# Patient Record
Sex: FEMALE | Race: OTHER | NOT HISPANIC OR LATINO | Employment: FULL TIME | URBAN - METROPOLITAN AREA
[De-identification: names, ages, dates, MRNs, and addresses within clinical notes are randomized per-mention and may not be internally consistent; named-entity substitution may affect disease eponyms.]

---
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
## 2020-03-20 ENCOUNTER — HOSPITAL ENCOUNTER (EMERGENCY)
Facility: HOSPITAL | Age: 20
Discharge: HOME | End: 2020-03-20
Attending: EMERGENCY MEDICINE | Admitting: EMERGENCY MEDICINE

## 2020-03-20 ENCOUNTER — APPOINTMENT (OUTPATIENT)
Dept: CT IMAGING | Facility: HOSPITAL | Age: 20
End: 2020-03-20

## 2020-03-20 VITALS
BODY MASS INDEX: 33.66 KG/M2 | DIASTOLIC BLOOD PRESSURE: 71 MMHG | OXYGEN SATURATION: 98 % | SYSTOLIC BLOOD PRESSURE: 106 MMHG | RESPIRATION RATE: 14 BRPM | HEIGHT: 63 IN | WEIGHT: 190 LBS | HEART RATE: 120 BPM | TEMPERATURE: 97.6 F

## 2020-03-20 DIAGNOSIS — S09.90XA INJURY OF HEAD, INITIAL ENCOUNTER: Primary | ICD-10-CM

## 2020-03-20 PROCEDURE — 99282 EMERGENCY DEPT VISIT SF MDM: CPT

## 2020-03-20 PROCEDURE — 70450 CT HEAD/BRAIN W/O DYE: CPT

## 2020-05-26 ENCOUNTER — LAB (OUTPATIENT)
Dept: LAB | Facility: HOSPITAL | Age: 20
End: 2020-05-26

## 2020-05-26 ENCOUNTER — TRANSCRIBE ORDERS (OUTPATIENT)
Dept: LAB | Facility: HOSPITAL | Age: 20
End: 2020-05-26

## 2020-05-26 DIAGNOSIS — Z78.9 OTHER SPECIFIED CONSTITUTIONAL STATES IN DEVELOPMENT: Primary | ICD-10-CM

## 2020-05-26 DIAGNOSIS — Z78.9 OTHER SPECIFIED CONSTITUTIONAL STATES IN DEVELOPMENT: ICD-10-CM

## 2020-05-26 LAB
HBV SURFACE AB SER RIA-ACNC: REACTIVE
HCV AB SER DONR QL: NORMAL
HIV1+2 AB SER QL: NORMAL

## 2020-05-26 PROCEDURE — 86592 SYPHILIS TEST NON-TREP QUAL: CPT

## 2020-05-26 PROCEDURE — G0432 EIA HIV-1/HIV-2 SCREEN: HCPCS

## 2020-05-26 PROCEDURE — 36415 COLL VENOUS BLD VENIPUNCTURE: CPT

## 2020-05-26 PROCEDURE — 86706 HEP B SURFACE ANTIBODY: CPT

## 2020-05-26 PROCEDURE — 86803 HEPATITIS C AB TEST: CPT

## 2020-05-27 LAB — RPR SER QL: NORMAL

## 2020-10-07 ENCOUNTER — TRANSCRIBE ORDERS (OUTPATIENT)
Dept: LAB | Facility: HOSPITAL | Age: 20
End: 2020-10-07

## 2020-10-07 ENCOUNTER — LAB (OUTPATIENT)
Dept: LAB | Facility: HOSPITAL | Age: 20
End: 2020-10-07

## 2020-10-07 DIAGNOSIS — R30.0 DYSURIA: ICD-10-CM

## 2020-10-07 DIAGNOSIS — R30.0 DYSURIA: Primary | ICD-10-CM

## 2020-10-07 PROCEDURE — 87088 URINE BACTERIA CULTURE: CPT

## 2020-10-07 PROCEDURE — 81001 URINALYSIS AUTO W/SCOPE: CPT

## 2020-10-07 PROCEDURE — 87186 SC STD MICRODIL/AGAR DIL: CPT

## 2020-10-07 PROCEDURE — 87086 URINE CULTURE/COLONY COUNT: CPT

## 2020-10-08 LAB
BACTERIA UR QL AUTO: ABNORMAL /HPF
BILIRUB UR QL STRIP: NEGATIVE
CLARITY UR: ABNORMAL
COD CRY URNS QL: ABNORMAL /HPF
COLOR UR: ABNORMAL
GLUCOSE UR STRIP-MCNC: NEGATIVE MG/DL
HGB UR QL STRIP.AUTO: ABNORMAL
HYALINE CASTS UR QL AUTO: ABNORMAL /LPF
KETONES UR QL STRIP: NEGATIVE
LEUKOCYTE ESTERASE UR QL STRIP.AUTO: ABNORMAL
NITRITE UR QL STRIP: POSITIVE
PH UR STRIP.AUTO: 5.5 [PH] (ref 5–8)
PROT UR QL STRIP: NEGATIVE
RBC # UR: ABNORMAL /HPF
REF LAB TEST METHOD: ABNORMAL
SP GR UR STRIP: 1.03 (ref 1–1.03)
SQUAMOUS #/AREA URNS HPF: ABNORMAL /HPF
UROBILINOGEN UR QL STRIP: ABNORMAL
WBC UR QL AUTO: ABNORMAL /HPF

## 2020-10-10 LAB — BACTERIA SPEC AEROBE CULT: ABNORMAL

## 2021-09-14 PROCEDURE — U0004 COV-19 TEST NON-CDC HGH THRU: HCPCS | Performed by: NURSE PRACTITIONER

## 2021-09-16 ENCOUNTER — TELEPHONE (OUTPATIENT)
Dept: URGENT CARE | Facility: CLINIC | Age: 21
End: 2021-09-16

## 2021-10-19 ENCOUNTER — OFFICE VISIT (OUTPATIENT)
Dept: FAMILY MEDICINE CLINIC | Facility: CLINIC | Age: 21
End: 2021-10-19

## 2021-10-19 ENCOUNTER — TELEPHONE (OUTPATIENT)
Dept: FAMILY MEDICINE CLINIC | Facility: CLINIC | Age: 21
End: 2021-10-19

## 2021-10-19 VITALS
OXYGEN SATURATION: 98 % | BODY MASS INDEX: 45.36 KG/M2 | SYSTOLIC BLOOD PRESSURE: 124 MMHG | DIASTOLIC BLOOD PRESSURE: 76 MMHG | HEIGHT: 63 IN | WEIGHT: 256 LBS | HEART RATE: 101 BPM

## 2021-10-19 DIAGNOSIS — R10.9 ABDOMINAL PAIN, UNSPECIFIED ABDOMINAL LOCATION: ICD-10-CM

## 2021-10-19 DIAGNOSIS — Z00.00 PREVENTATIVE HEALTH CARE: ICD-10-CM

## 2021-10-19 DIAGNOSIS — F41.9 ANXIETY AND DEPRESSION: ICD-10-CM

## 2021-10-19 DIAGNOSIS — F32.A ANXIETY AND DEPRESSION: ICD-10-CM

## 2021-10-19 DIAGNOSIS — R11.0 NAUSEA: Primary | ICD-10-CM

## 2021-10-19 PROCEDURE — 99214 OFFICE O/P EST MOD 30 MIN: CPT | Performed by: PHYSICIAN ASSISTANT

## 2021-10-19 RX ORDER — FLUOXETINE HYDROCHLORIDE 20 MG/1
20 CAPSULE ORAL DAILY
Qty: 30 CAPSULE | Refills: 1 | OUTPATIENT
Start: 2021-10-19 | End: 2021-12-09

## 2021-10-19 RX ORDER — FAMOTIDINE 20 MG/1
20 TABLET, FILM COATED ORAL 2 TIMES DAILY
Qty: 60 TABLET | Refills: 1 | OUTPATIENT
Start: 2021-10-19 | End: 2021-12-09

## 2021-10-19 NOTE — TELEPHONE ENCOUNTER
Please notify the patient I am sending in fluoxetine for her to start once daily in the morning. It is okay for her to continue taking hydroxyzine with this medication.
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
## 2021-10-19 NOTE — TELEPHONE ENCOUNTER
Caller: Miguel Angel Zuniga    Relationship: Self    Best call back number: [phone]    What was the call regarding: PATIENT STATES THE MEDICATION SHE TAKES IS HYDROXYZINE 25 MG

## 2021-10-20 ENCOUNTER — TELEPHONE (OUTPATIENT)
Dept: FAMILY MEDICINE CLINIC | Facility: CLINIC | Age: 21
End: 2021-10-20

## 2021-10-20 RX ORDER — HYDROXYZINE HYDROCHLORIDE 25 MG/1
25 TABLET, FILM COATED ORAL 3 TIMES DAILY PRN
Qty: 60 TABLET | Refills: 1 | OUTPATIENT
Start: 2021-10-20 | End: 2021-12-09

## 2021-10-20 NOTE — TELEPHONE ENCOUNTER
Caller: Miguel Angel Zuniga    Relationship: Self    Best call back number: [phone]    What is the best time to reach you: ANYTIME     Who are you requesting to speak with (clinical staff, provider,  specific staff member): CLINICAL STAFFF      What was the call regarding: THE PATIENT STATES THAT SHE WAS SEEN YESTERDAY BY EMILY GAMA AND DURING THAT APPOINTMENT HE TOLD HER THAT HE WOULD CALL IN A MEDICATION FOR ANXIETY AND DEPRESSION TO [facility]. THE PHARMACY TOLD THE PATIENT THAT THE DID NOT RECEIVE A PRESCRIPTION PLEASE CALL PATIENT TO LET HER KNOW IF THE MEDICATION WAS SENT IN     Do you require a callback: YES

## 2021-10-20 NOTE — TELEPHONE ENCOUNTER
Pepcid and Prozac were sent yesterday. She is also on hydroxyzine. I will refill that one as well. Please confirm that these are received with her pharmacy.

## 2021-10-21 NOTE — TELEPHONE ENCOUNTER
Called pt to inform. Stated she had not received the fluoxetine. Called pharmacy. Informed pt picked up the fluoxetine and hydroxyzine yesterday and famotidine day before. Called pt back. LVM informing pt should have all medications ordered. Office number given for any questions/concerns.

## 2021-12-09 PROCEDURE — U0004 COV-19 TEST NON-CDC HGH THRU: HCPCS | Performed by: FAMILY MEDICINE

## 2021-12-10 ENCOUNTER — TELEPHONE (OUTPATIENT)
Dept: URGENT CARE | Facility: CLINIC | Age: 21
End: 2021-12-10

## 2021-12-10 NOTE — TELEPHONE ENCOUNTER
INFORMED PT COVID NOT DETECTED. REQUESTED PRINTED COPY OF RESULTS, THEY ARE IN PT PICKUP. NO QUESTIONS. NO CONCERNS.

## 2022-01-12 PROCEDURE — U0004 COV-19 TEST NON-CDC HGH THRU: HCPCS | Performed by: FAMILY MEDICINE

## 2022-01-13 ENCOUNTER — TELEPHONE (OUTPATIENT)
Dept: URGENT CARE | Facility: CLINIC | Age: 22
End: 2022-01-13

## 2022-01-13 NOTE — TELEPHONE ENCOUNTER
----- Message from Raghav Marc MD sent at 1/13/2022 12:59 PM EST -----  Please inform patient of negative COVID test    ----- Message -----  From: Lab, Background User  Sent: 1/13/2022  12:53 PM EST  To: [facility] Jimy Lei Covid Results

## 2022-01-19 ENCOUNTER — OFFICE VISIT (OUTPATIENT)
Dept: FAMILY MEDICINE CLINIC | Facility: CLINIC | Age: 22
End: 2022-01-19

## 2022-01-19 VITALS
WEIGHT: 260.6 LBS | BODY MASS INDEX: 46.18 KG/M2 | OXYGEN SATURATION: 99 % | DIASTOLIC BLOOD PRESSURE: 88 MMHG | HEIGHT: 63 IN | HEART RATE: 106 BPM | SYSTOLIC BLOOD PRESSURE: 138 MMHG

## 2022-01-19 DIAGNOSIS — J06.9 ACUTE URI: Primary | ICD-10-CM

## 2022-01-19 DIAGNOSIS — Z20.822 SUSPECTED 2019 NOVEL CORONAVIRUS INFECTION: ICD-10-CM

## 2022-01-19 DIAGNOSIS — J06.9 ACUTE URI: ICD-10-CM

## 2022-01-19 PROCEDURE — U0004 COV-19 TEST NON-CDC HGH THRU: HCPCS | Performed by: FAMILY MEDICINE

## 2022-01-19 PROCEDURE — 99213 OFFICE O/P EST LOW 20 MIN: CPT | Performed by: FAMILY MEDICINE

## 2022-01-19 NOTE — PROGRESS NOTES
"Chief Complaint  Cough, Headache, Vomiting, Generalized Body Aches, and Shortness of Breath    Subjective          Miguel Angel Zuniga presents to [facility]  Cough  This is a new problem. The current episode started yesterday. The cough is productive of sputum. Associated symptoms include headaches, nasal congestion, postnasal drip and shortness of breath. Pertinent negatives include no chills, rhinorrhea, sore throat or wheezing. She has tried OTC cough suppressant for the symptoms. The treatment provided mild relief. Her past medical history is significant for asthma.   Headache   Associated symptoms include coughing and vomiting. Pertinent negatives include no rhinorrhea or sore throat.   Vomiting   This is a new problem. Emesis appearance: mucus. Associated symptoms include coughing and headaches. Pertinent negatives include no chills.   Shortness of Breath  Associated symptoms include headaches and vomiting. Pertinent negatives include no rhinorrhea, sore throat or wheezing. Exacerbated by: coughing. Her past medical history is significant for asthma.     She was seen at urgent care on 1/12/2022 and had COVID testing done at that time which was negative. Last week migraine, stomach pain and body aches. Yesterday started cough with mucus. Heavy breathing after coughing. Felt feverish yesterday. She doesn't used inhalers for asthma in awhile.           Objective   Vital Signs:   /88   Pulse 106   Ht 160 cm (62.99\")   Wt 118 kg (260 lb 9.6 oz)   SpO2 99%   BMI 46.17 kg/m²     Physical Exam  Vitals reviewed.   Constitutional:       General: She is not in acute distress.     Appearance: She is ill-appearing. She is not toxic-appearing or diaphoretic.   HENT:      Nose: Mucosal edema present. No rhinorrhea.      Right Sinus: No maxillary sinus tenderness or frontal sinus tenderness.      Left Sinus: No maxillary sinus tenderness or frontal sinus tenderness.      Mouth/Throat:      " Mouth: Mucous membranes are moist.      Pharynx: Oropharyngeal exudate ( clear PND) present. No pharyngeal swelling, posterior oropharyngeal erythema or uvula swelling.      Tonsils: No tonsillar exudate or tonsillar abscesses. 2+ on the right. 2+ on the left.   Eyes:      General:         Right eye: No discharge.         Left eye: No discharge.   Cardiovascular:      Rate and Rhythm: Normal rate and regular rhythm.   Pulmonary:      Effort: Pulmonary effort is normal.      Breath sounds: Normal breath sounds.   Skin:     Findings: Rash ( facial acne) present.   Neurological:      Mental Status: She is alert.        Result Review :   The following data was reviewed by: Pinky Pugh MD on 01/19/2022:             [facility] with Nicol Ayala APRN (01/12/2022)  POC Urinalysis Dipstick, Multipro (Automated dipstick) (01/12/2022 17:22)  COVID-19 PCR, [facility], NP SWAB IN [facility] VIRAL TRANSPORT MEDIA/ORAL SWISH 24-30 HR TAT - Saliva, Oral Cavity (01/12/2022 17:20)    Assessment and Plan    Diagnoses and all orders for this visit:    1. Acute URI (Primary)  -     COVID-19 PCR, [facility], NP SWAB IN [facility] VIRAL TRANSPORT MEDIA/ORAL SWISH 24-30 HR TAT - Swab, Nasopharynx; Future    2. Suspected 2019 novel coronavirus infection  -     COVID-19 PCR, [facility], NP SWAB IN [facility] VIRAL TRANSPORT MEDIA/ORAL SWISH 24-30 HR TAT - Swab, Nasopharynx; Future    Likely viral infection and no need for antibiotics at this time.  Recommend supportive care with over-the-counter products.  COVID swab collected today and advised to stay in quarantine with pending results.  Work note provided.        Follow Up   Return if symptoms worsen or fail to improve.  Patient was given instructions and counseling regarding her condition or for health maintenance advice. Please see specific information pulled into the AVS if appropriate.     Electronically signed by Pinky Pugh MD, 01/19/22, 2:32 PM EST.

## 2022-01-20 LAB — SARS-COV-2 RNA NOSE QL NAA+PROBE: DETECTED

## 2022-01-21 ENCOUNTER — TELEPHONE (OUTPATIENT)
Dept: FAMILY MEDICINE CLINIC | Facility: CLINIC | Age: 22
End: 2022-01-21

## 2022-02-10 ENCOUNTER — LAB (OUTPATIENT)
Dept: LAB | Facility: HOSPITAL | Age: 22
End: 2022-02-10

## 2022-02-10 ENCOUNTER — OFFICE VISIT (OUTPATIENT)
Dept: FAMILY MEDICINE CLINIC | Facility: CLINIC | Age: 22
End: 2022-02-10

## 2022-02-10 VITALS
DIASTOLIC BLOOD PRESSURE: 80 MMHG | OXYGEN SATURATION: 98 % | BODY MASS INDEX: 60.03 KG/M2 | WEIGHT: 259.4 LBS | SYSTOLIC BLOOD PRESSURE: 112 MMHG | HEIGHT: 55 IN | HEART RATE: 111 BPM

## 2022-02-10 DIAGNOSIS — K52.9 CHRONIC DIARRHEA: ICD-10-CM

## 2022-02-10 DIAGNOSIS — Z00.00 PREVENTATIVE HEALTH CARE: ICD-10-CM

## 2022-02-10 DIAGNOSIS — R11.0 NAUSEA: ICD-10-CM

## 2022-02-10 DIAGNOSIS — R10.84 GENERALIZED POSTPRANDIAL ABDOMINAL PAIN: ICD-10-CM

## 2022-02-10 DIAGNOSIS — R10.84 GENERALIZED POSTPRANDIAL ABDOMINAL PAIN: Primary | ICD-10-CM

## 2022-02-10 LAB
25(OH)D3 SERPL-MCNC: 14.2 NG/ML
DEPRECATED RDW RBC AUTO: 41.3 FL (ref 37–54)
ERYTHROCYTE [DISTWIDTH] IN BLOOD BY AUTOMATED COUNT: 13 % (ref 12.3–15.4)
EXPIRATION DATE: NORMAL
FLUAV AG NPH QL: NEGATIVE
FLUBV AG NPH QL: NEGATIVE
HCT VFR BLD AUTO: 41.5 % (ref 34–46.6)
HGB BLD-MCNC: 14.2 G/DL (ref 12–15.9)
INTERNAL CONTROL: NORMAL
Lab: NORMAL
MCH RBC QN AUTO: 30 PG (ref 26.6–33)
MCHC RBC AUTO-ENTMCNC: 34.2 G/DL (ref 31.5–35.7)
MCV RBC AUTO: 87.7 FL (ref 79–97)
PLATELET # BLD AUTO: 513 10*3/MM3 (ref 140–450)
PMV BLD AUTO: 10.5 FL (ref 6–12)
RBC # BLD AUTO: 4.73 10*6/MM3 (ref 3.77–5.28)
VIT B12 BLD-MCNC: 473 PG/ML (ref 211–946)
WBC NRBC COR # BLD: 9.23 10*3/MM3 (ref 3.4–10.8)

## 2022-02-10 PROCEDURE — 86231 EMA EACH IG CLASS: CPT

## 2022-02-10 PROCEDURE — 84443 ASSAY THYROID STIM HORMONE: CPT

## 2022-02-10 PROCEDURE — 82784 ASSAY IGA/IGD/IGG/IGM EACH: CPT

## 2022-02-10 PROCEDURE — 80053 COMPREHEN METABOLIC PANEL: CPT

## 2022-02-10 PROCEDURE — 82607 VITAMIN B-12: CPT

## 2022-02-10 PROCEDURE — 86140 C-REACTIVE PROTEIN: CPT

## 2022-02-10 PROCEDURE — 86364 TISS TRNSGLTMNASE EA IG CLAS: CPT

## 2022-02-10 PROCEDURE — 87804 INFLUENZA ASSAY W/OPTIC: CPT | Performed by: STUDENT IN AN ORGANIZED HEALTH CARE EDUCATION/TRAINING PROGRAM

## 2022-02-10 PROCEDURE — 83690 ASSAY OF LIPASE: CPT

## 2022-02-10 PROCEDURE — 85027 COMPLETE CBC AUTOMATED: CPT

## 2022-02-10 PROCEDURE — 36415 COLL VENOUS BLD VENIPUNCTURE: CPT

## 2022-02-10 PROCEDURE — 99214 OFFICE O/P EST MOD 30 MIN: CPT | Performed by: STUDENT IN AN ORGANIZED HEALTH CARE EDUCATION/TRAINING PROGRAM

## 2022-02-10 PROCEDURE — 82306 VITAMIN D 25 HYDROXY: CPT

## 2022-02-10 RX ORDER — ONDANSETRON 4 MG/1
4 TABLET, ORALLY DISINTEGRATING ORAL EVERY 8 HOURS PRN
Qty: 30 TABLET | Refills: 0 | Status: SHIPPED | OUTPATIENT
Start: 2022-02-10 | End: 2022-11-23

## 2022-02-10 NOTE — PROGRESS NOTES
"  Established Office Visit      Patient Name: Miguel Angel Zuniga  : 2000   MRN: [id number]   Care Team: Patient Care Team:  Cristhian Brown PA as PCP - General (Physician Assistant)    Chief Complaint:    Chief Complaint   Patient presents with   • Abdominal Pain   • Diarrhea   • Generalized Body Aches       History of Present Illness: Miguel Angel Zuniga is a 21 y.o. female who is here today to discuss complaint of diarrhea and abdominal pain    Patient complains of acute on chronic abdominal pain. Reports ongoing generalized abdominal pain which flares about 3x per month with diarrhea and nausea. Symptoms usually start about 15 minutes after eating a meal and improve spontaneously within 24 hours. Unable to identify dietary triggers. She experienced intense diffuse abdominal pain (R>L) yesterday with several episodes of diarrhea, today this has resolved.     Denies fever, chills, hematochezia, changes in stool color, or vomiting.     She recently recovered from covid19 infection, diagnosed on  and quarantined until .     Subjective      Review of Systems:   Review of Systems - See HPI    I have reviewed and the following portions of the patient's history were updated as appropriate: past family history, past medical history, past social history, past surgical history and problem list.    Medications:     Current Outpatient Medications:   •  ondansetron ODT (Zofran ODT) 4 MG disintegrating tablet, Place 1 tablet on the tongue Every 8 (Eight) Hours As Needed for Nausea or Vomiting., Disp: 30 tablet, Rfl: 0    Allergies:   No Known Allergies    Objective     Physical Exam:  Vital Signs:   Vitals:    02/10/22 1349   BP: 112/80   Pulse: 111   SpO2: 98%   Weight: 118 kg (259 lb 6.4 oz)   Height: 63 cm (24.8\")     Body mass index is 296.55 kg/m².     Physical Exam  Vitals reviewed.   Constitutional:       Appearance: Normal appearance. She is obese.   Cardiovascular:      Rate and Rhythm: Normal rate.      Pulses: " Normal pulses.   Pulmonary:      Effort: Pulmonary effort is normal. No respiratory distress.   Abdominal:      General: Abdomen is flat. Bowel sounds are normal. There is no distension.      Palpations: Abdomen is soft. There is no mass.      Tenderness: There is no abdominal tenderness. There is no guarding or rebound.   Skin:     General: Skin is warm and dry.   Neurological:      Mental Status: She is alert.   Psychiatric:         Mood and Affect: Mood normal.         Behavior: Behavior normal.         Judgment: Judgment normal.         Assessment / Plan      Assessment/Plan:   Problems Addressed This Visit  Diagnoses and all orders for this visit:    1. Generalized postprandial abdominal pain (Primary)  -     Comprehensive metabolic panel; Future  -     C-reactive protein; Future  -     Celiac Disease Panel; Future  -     US Gallbladder; Future  -     CBC No Differential; Future  -     TSH Rfx On Abnormal To Free T4; Future    2. Nausea  -     POCT Influenza A/B  -     ondansetron ODT (Zofran ODT) 4 MG disintegrating tablet; Place 1 tablet on the tongue Every 8 (Eight) Hours As Needed for Nausea or Vomiting.  Dispense: 30 tablet; Refill: 0    3. Chronic diarrhea  -     Comprehensive metabolic panel; Future  -     C-reactive protein; Future  -     Celiac Disease Panel; Future  -     CBC No Differential; Future  -     TSH Rfx On Abnormal To Free T4; Future    Discussed multiple etiologies for her abdominal pain which has been ongoing for years and flares about 3x per month with intense abdominal cramping, diarrhea, and nausea. Her exam is benign today and symptoms have resolved. We were able to pinpoint that symptoms occur almost exclusively after eating. Will obtain RUQ US to evaluate for gall gladder dysfunction. Will obtain labs to evaluate for underlying metabolic process and celiac disease. Discussed possibility of IBS-D as well. I have advised her to start food journal to identify patterns and triggers.        Plan of care reviewed with patient at the conclusion of today's visit. Education was provided regarding diagnosis and management.  Patient verbalizes understanding of and agreement with management plan.    Follow Up:   Return for Next scheduled follow up.        DO RAJWINDER Busby RD  [facility]  [id number]  [facility] [address]  Fax [phone]  Phone [phone]

## 2022-02-11 ENCOUNTER — TELEPHONE (OUTPATIENT)
Dept: FAMILY MEDICINE CLINIC | Facility: CLINIC | Age: 22
End: 2022-02-11

## 2022-02-11 DIAGNOSIS — E55.9 VITAMIN D DEFICIENCY: Primary | ICD-10-CM

## 2022-02-11 LAB
ALBUMIN SERPL-MCNC: 4.2 G/DL (ref 3.5–5.2)
ALBUMIN/GLOB SERPL: 1.7 G/DL
ALP SERPL-CCNC: 99 U/L (ref 39–117)
ALT SERPL W P-5'-P-CCNC: 46 U/L (ref 1–33)
ANION GAP SERPL CALCULATED.3IONS-SCNC: 9.4 MMOL/L (ref 5–15)
AST SERPL-CCNC: 25 U/L (ref 1–32)
BILIRUB SERPL-MCNC: 0.4 MG/DL (ref 0–1.2)
BUN SERPL-MCNC: 6 MG/DL (ref 6–20)
BUN/CREAT SERPL: 10.2 (ref 7–25)
CALCIUM SPEC-SCNC: 9.4 MG/DL (ref 8.6–10.5)
CHLORIDE SERPL-SCNC: 105 MMOL/L (ref 98–107)
CO2 SERPL-SCNC: 24.6 MMOL/L (ref 22–29)
CREAT SERPL-MCNC: 0.59 MG/DL (ref 0.57–1)
CRP SERPL-MCNC: 1.45 MG/DL (ref 0–0.5)
GFR SERPL CREATININE-BSD FRML MDRD: 129 ML/MIN/1.73
GFR SERPL CREATININE-BSD FRML MDRD: >150 ML/MIN/1.73
GLOBULIN UR ELPH-MCNC: 2.5 GM/DL
GLUCOSE SERPL-MCNC: 115 MG/DL (ref 65–99)
LIPASE SERPL-CCNC: 28 U/L (ref 13–60)
POTASSIUM SERPL-SCNC: 3.6 MMOL/L (ref 3.5–5.2)
PROT SERPL-MCNC: 6.7 G/DL (ref 6–8.5)
SODIUM SERPL-SCNC: 139 MMOL/L (ref 136–145)
TSH SERPL DL<=0.05 MIU/L-ACNC: 3.5 UIU/ML (ref 0.27–4.2)

## 2022-02-11 RX ORDER — MULTIVIT-MIN/IRON/FOLIC ACID/K 18-600-40
2000 CAPSULE ORAL DAILY
Qty: 90 CAPSULE | Refills: 1 | Status: SHIPPED | OUTPATIENT
Start: 2022-02-11 | End: 2022-11-23

## 2022-02-11 NOTE — TELEPHONE ENCOUNTER
I attempted to call patient back x2 regarding lab results. No answer, left voicemail.      Overall labs look okay. Her CRP is very mildly elevated which is a very nonspecific lab for inflammation. This may be due to her recovering from recent covid infection.   Her Liver function and kidney function look good. Electrolytes normal. Blood counts look good. Thyroid function is normal. Lipase is normal.     Vitamin D level is low, I have sent in a vitamin D replacement she should take once daily.    Still waiting on celiac disease screening to return which will take several days.

## 2022-02-11 NOTE — TELEPHONE ENCOUNTER
PATIENT HAS CALLED REQUESTING A CALL BACK WITH THE RESULTS OF HER LAB WORK FORM 2/10/22. PATIENT IS ALSO WANTING TO KNOW IF AN ULTRASOUND HAS BEEN SCHEDULED FOR HER.    CALL BACK NUMBER [phone]

## 2022-02-13 LAB
ENDOMYSIUM IGA SER QL: NEGATIVE
IGA SERPL-MCNC: 267 MG/DL (ref 87–352)
TTG IGA SER-ACNC: <2 U/ML (ref 0–3)

## 2022-02-15 PROBLEM — E55.9 VITAMIN D DEFICIENCY: Status: ACTIVE | Noted: 2022-02-15

## 2022-02-15 RX ORDER — ERGOCALCIFEROL 1.25 MG/1
50000 CAPSULE ORAL WEEKLY
Qty: 12 CAPSULE | Refills: 0 | Status: SHIPPED | OUTPATIENT
Start: 2022-02-15 | End: 2022-11-23

## 2022-02-15 NOTE — TELEPHONE ENCOUNTER
Called and spoke to patient. Verbalized understanding. Patient asked if she should still have her ultrasound? Stated she is still having abd pain, however her mother told her she also might be lactose intolerant?  Patient just wanted to make sure she should still continue with ultrasound. Please advise, thanksHad no further questions at this time.

## 2022-02-16 NOTE — TELEPHONE ENCOUNTER
The ultrasound was ordered to evaluate her gallbladder. If she wants to make some dietary changes like eliminating lactose to see if things get better before scheduling the ultrasound, I think that is reasonable. But if she would like to go ahead and have her gallbladder evaluated then I would keep the appointment.

## 2022-02-17 NOTE — TELEPHONE ENCOUNTER
Called and informed patient of providers recommendations. Verbalized understanding. Stated she is still wanting to have US completed just to see what it says as well. Had no further questions/concerns at this time.

## 2022-02-22 ENCOUNTER — OFFICE VISIT (OUTPATIENT)
Dept: FAMILY MEDICINE CLINIC | Facility: CLINIC | Age: 22
End: 2022-02-22

## 2022-02-22 VITALS
BODY MASS INDEX: 46.42 KG/M2 | WEIGHT: 262 LBS | DIASTOLIC BLOOD PRESSURE: 84 MMHG | SYSTOLIC BLOOD PRESSURE: 134 MMHG | HEART RATE: 116 BPM | HEIGHT: 63 IN | OXYGEN SATURATION: 98 % | RESPIRATION RATE: 16 BRPM | TEMPERATURE: 97.3 F

## 2022-02-22 DIAGNOSIS — E66.01 MORBID (SEVERE) OBESITY DUE TO EXCESS CALORIES: Primary | ICD-10-CM

## 2022-02-22 DIAGNOSIS — Z23 NEED FOR IMMUNIZATION AGAINST INFLUENZA: ICD-10-CM

## 2022-02-22 DIAGNOSIS — R00.0 TACHYCARDIA: ICD-10-CM

## 2022-02-22 PROBLEM — E66.813 CLASS 3 SEVERE OBESITY DUE TO EXCESS CALORIES WITH BODY MASS INDEX (BMI) OF 45.0 TO 49.9 IN ADULT: Status: ACTIVE | Noted: 2022-02-22

## 2022-02-22 PROCEDURE — 99213 OFFICE O/P EST LOW 20 MIN: CPT | Performed by: PHYSICIAN ASSISTANT

## 2022-02-22 PROCEDURE — 90686 IIV4 VACC NO PRSV 0.5 ML IM: CPT | Performed by: PHYSICIAN ASSISTANT

## 2022-02-22 PROCEDURE — 90471 IMMUNIZATION ADMIN: CPT | Performed by: PHYSICIAN ASSISTANT

## 2022-02-22 RX ORDER — NICOTINE 21 MG/24HR
1 PATCH, TRANSDERMAL 24 HOURS TRANSDERMAL EVERY 24 HOURS
Qty: 42 PATCH | Refills: 0 | Status: SHIPPED | OUTPATIENT
Start: 2022-02-22 | End: 2022-04-05

## 2022-02-22 NOTE — PROGRESS NOTES
"Chief Complaint   Patient presents with   • Weight Loss       HPI     Miguel Angel Zuniga is a 21 y.o. female who is here for evaluation of \"chief complaint.\"     She is supposed to have a Zoom meeting tomorrow with bariatric surgery.  She was notified that she will need to have monthly visits for 6 months for approval of this through her insurance. Has tried various diets in the past and exercising none of which helped her to lose weight significantly. She is trying to eat 2 meals instead of 3 daily at this time. She is not following a particular diet however. She did go grocery shopping and plans on trying to eat more vegetables and fruits and reduce junk food and snacks. She walks due to her work but does not exercise outside of her work. History of GERD.     Pulse high for a few days, was told this when she went to donate plasma. She is asymptomatic and denies chest pain, shortness of breath, palpitations, and dizziness. She has not had any caffeine today. Vapes and this does contain nicotine. Denies OTC decongestants.     Past Medical History:   Diagnosis Date   • Asthma    • Depression    • GERD (gastroesophageal reflux disease)    • Hypertension        Past Surgical History:   Procedure Laterality Date   • NO PAST SURGERIES         Family History   Problem Relation Age of Onset   • Anxiety disorder Sister    • Depression Sister    • Post-traumatic stress disorder Maternal Uncle    • Anxiety disorder Maternal Uncle    • Depression Maternal Uncle        Social History     Socioeconomic History   • Marital status: Single   Tobacco Use   • Smoking status: Former Smoker     Types: Cigarettes     Quit date: 2021     Years since quittin.8   • Smokeless tobacco: Never Used   Vaping Use   • Vaping Use: Every day   • Substances: Nicotine   Substance and Sexual Activity   • Alcohol use: Yes     Comment: OCASSIONALY   • Drug use: Not Currently   • Sexual activity: Defer       No Known Allergies    ROS    Review of " Systems   Respiratory: Negative for shortness of breath.    Cardiovascular: Negative for chest pain and palpitations.   Neurological: Negative for dizziness and light-headedness.   Psychiatric/Behavioral: Negative for stress. The patient is not nervous/anxious.        Vitals:    02/22/22 1531   BP: 134/84   Pulse: 116   Resp: 16   Temp: 97.3 °F (36.3 °C)   SpO2: 98%     Body mass index is 46.41 kg/m².      Current Outpatient Medications:   •  ondansetron ODT (Zofran ODT) 4 MG disintegrating tablet, Place 1 tablet on the tongue Every 8 (Eight) Hours As Needed for Nausea or Vomiting., Disp: 30 tablet, Rfl: 0  •  vitamin D (ERGOCALCIFEROL) 1.25 MG (98287 UT) capsule capsule, Take 1 capsule by mouth 1 (One) Time Per Week., Disp: 12 capsule, Rfl: 0  •  Vitamin D, Cholecalciferol, 50 MCG (2000 UT) capsule, Take 1 capsule by mouth Daily., Disp: 90 capsule, Rfl: 1  •  nicotine (NICODERM CQ) 14 MG/24HR patch, Place 1 patch on the skin as directed by provider Daily for 42 days. Then start 7 mg patches, Disp: 42 patch, Rfl: 0  •  nicotine (NICODERM CQ) 7 MG/24HR patch, Place 1 patch on the skin as directed by provider Daily for 14 days., Disp: 14 patch, Rfl: 0    PE    Physical Exam  Vitals reviewed.   Constitutional:       General: She is not in acute distress.     Appearance: She is well-developed. She is morbidly obese.   HENT:      Head: Normocephalic and atraumatic.   Eyes:      Conjunctiva/sclera: Conjunctivae normal.   Cardiovascular:      Rate and Rhythm: Normal rate and regular rhythm.      Heart sounds: Normal heart sounds. No murmur heard.      Pulmonary:      Effort: Pulmonary effort is normal.      Breath sounds: Normal breath sounds.   Musculoskeletal:      Cervical back: Normal range of motion.   Skin:     General: Skin is warm and dry.   Neurological:      Mental Status: She is alert.      Gait: Gait normal.   Psychiatric:         Speech: Speech normal.         Behavior: Behavior normal.          Results    Results for orders placed or performed in visit on 02/10/22   Comprehensive metabolic panel    Specimen: Blood   Result Value Ref Range    Glucose 115 (H) 65 - 99 mg/dL    BUN 6 6 - 20 mg/dL    Creatinine 0.59 0.57 - 1.00 mg/dL    Sodium 139 136 - 145 mmol/L    Potassium 3.6 3.5 - 5.2 mmol/L    Chloride 105 98 - 107 mmol/L    CO2 24.6 22.0 - 29.0 mmol/L    Calcium 9.4 8.6 - 10.5 mg/dL    Total Protein 6.7 6.0 - 8.5 g/dL    Albumin 4.20 3.50 - 5.20 g/dL    ALT (SGPT) 46 (H) 1 - 33 U/L    AST (SGOT) 25 1 - 32 U/L    Alkaline Phosphatase 99 39 - 117 U/L    Total Bilirubin 0.4 0.0 - 1.2 mg/dL    eGFR Non African Amer 129 >60 mL/min/1.73    eGFR  African Amer >150 >60 mL/min/1.73    Globulin 2.5 gm/dL    A/G Ratio 1.7 g/dL    BUN/Creatinine Ratio 10.2 7.0 - 25.0    Anion Gap 9.4 5.0 - 15.0 mmol/L   C-reactive protein    Specimen: Blood   Result Value Ref Range    C-Reactive Protein 1.45 (H) 0.00 - 0.50 mg/dL   Celiac Disease Panel    Specimen: Blood   Result Value Ref Range    Endomysial IgA Negative Negative    Tissue Transglutaminase IgA <2 0 - 3 U/mL    IgA 267 87 - 352 mg/dL   CBC No Differential    Specimen: Blood   Result Value Ref Range    WBC 9.23 3.40 - 10.80 10*3/mm3    RBC 4.73 3.77 - 5.28 10*6/mm3    Hemoglobin 14.2 12.0 - 15.9 g/dL    Hematocrit 41.5 34.0 - 46.6 %    MCV 87.7 79.0 - 97.0 fL    MCH 30.0 26.6 - 33.0 pg    MCHC 34.2 31.5 - 35.7 g/dL    RDW 13.0 12.3 - 15.4 %    RDW-SD 41.3 37.0 - 54.0 fl    MPV 10.5 6.0 - 12.0 fL    Platelets 513 (H) 140 - 450 10*3/mm3   TSH Rfx On Abnormal To Free T4    Specimen: Blood   Result Value Ref Range    TSH 3.500 0.270 - 4.200 uIU/mL   Lipase    Specimen: Blood   Result Value Ref Range    Lipase 28 13 - 60 U/L   Vitamin D 25 Hydroxy    Specimen: Blood   Result Value Ref Range    25 Hydroxy, Vitamin D 14.2 ng/ml   Vitamin B12    Specimen: Blood   Result Value Ref Range    Vitamin B-12 473 211 - 946 pg/mL       A/P    Problem List Items Addressed  This Visit     None      Visit Diagnoses     Morbid (severe) obesity due to excess calories (HCC)    -  Primary  -Pursuing bariatric surgery  -Counseled on low-carb diet, exercise  -Return to clinic in 1 month      Tachycardia      -Pulse was initially 116 at check-in. This did come down to 96 on repeat today and sounds sinus  -Discussed that nicotine vaping likely contributes to her borderline tachycardia  -She is interested in trying nicotine patches to see if she can stop vaping.  I will send these to her pharmacy.      Need for immunization against influenza        Relevant Orders    FluLaval/Fluarix/Fluzone >6 Months ([id number]) (Completed)          Plan of care was reviewed with patient at the conclusion of today's visit. Education was provided regarding diagnoses, management, and the importance of keeping follow-up appointments. The patient was counseled regarding the risks, benefits, and possible side-effects of treatment. Patient and/or family express understanding and agreement with the management plan.        DINO Andrews

## 2022-02-24 ENCOUNTER — HOSPITAL ENCOUNTER (OUTPATIENT)
Dept: ULTRASOUND IMAGING | Facility: HOSPITAL | Age: 22
Discharge: HOME | End: 2022-02-24
Admitting: STUDENT IN AN ORGANIZED HEALTH CARE EDUCATION/TRAINING PROGRAM

## 2022-02-24 DIAGNOSIS — R10.84 GENERALIZED POSTPRANDIAL ABDOMINAL PAIN: ICD-10-CM

## 2022-02-24 PROCEDURE — 76705 ECHO EXAM OF ABDOMEN: CPT

## 2022-02-25 ENCOUNTER — TELEPHONE (OUTPATIENT)
Dept: FAMILY MEDICINE CLINIC | Facility: CLINIC | Age: 22
End: 2022-02-25

## 2022-03-18 ENCOUNTER — TELEPHONE (OUTPATIENT)
Dept: FAMILY MEDICINE CLINIC | Facility: CLINIC | Age: 22
End: 2022-03-18

## 2022-03-18 NOTE — TELEPHONE ENCOUNTER
Caller: Miguel Angel Zuniga    Relationship: Self    Best call back number: [phone]    What form or medical record are you requesting: PATIENT NEEDS A LETTER STATES THAT SHE HAD COVID-19 AND WAS RELEASED FROM [facility]     Who is requesting this form or medical record from you: [facility]     How would you like to receive the form or medical records (pick-up, mail, fax):PATIENT WOULD LIKE TO  THE LETTER BEFORE 03/25/2022    Timeframe paperwork needed: BEFORE 03/25/2022    Additional notes:PLEASE CALL PATIENT TO LET HER KNOW IF SHE CAN  THE LETTER

## 2022-03-18 NOTE — TELEPHONE ENCOUNTER
Contacted patient and notified her that letter has been created. She verbalized understanding and will stop into the office to  a copy during office hours

## 2022-03-21 NOTE — TELEPHONE ENCOUNTER
[facility]  ED Nurse Handoff Report    ED Chief complaint: Chest Pain      ED Diagnosis:   Final diagnoses:   Chest pain, unspecified type       Code Status: Admitting to rusty    Allergies:   Allergies   Allergen Reactions     Adhesive Tape Rash     dermabond  dermabond         Patient Story: pt lives at home. Pt reports sudden onset of CP this Am radiating into back. Pt reports took a nitroglycerin at home without relief. Pt arrived to ED continuing to C/O pain.   Focused Assessment:  Alert and oriented afebrile. VSS    Treatments and/or interventions provided: ntgx3 doses. Gi cocktail toradol  Patient's response to treatments and/or interventions: no change with ntg. Some relief with gi cocktail.     To be done/followed up on inpatient unit:  admission orders    Does this patient have any cognitive concerns?: none noted    Activity level - Baseline/Home:  Independent  Activity Level - Current:   Independent    Patient's Preferred language: English   Needed?: No    Isolation: None  Infection: Not Applicable  Patient tested for COVID 19 prior to admission: YES  Bariatric?: No    Vital Signs:   Vitals:    03/21/22 1125 03/21/22 1130 03/21/22 1200 03/21/22 1215   BP: 109/81 99/81 110/76 108/80   Pulse: 61 64 66 66   Resp: 14 11 13 11   Temp:       TempSrc:       SpO2: 95% 95% 97% 91%   Weight:       Height:           Cardiac Rhythm:     Was the PSS-3 completed:   Yes  What interventions are required if any?               Family Comments: none  OBS brochure/video discussed/provided to patient/family: Yes              Name of person given brochure if not patient:               Relationship to patient:     For the majority of the shift this patient's behavior was Green.   Behavioral interventions performed were .    ED NURSE PHONE NUMBER: *[phone]        PT CALLED ASKING ABOUT COVID TEST RESULTS. INFORMED COVID NOT DETECTED. PRINTED OUT RESULTS FOR PT TO . NO QUESTIONS, NO CONCERNS.    ----- Message from Raghav Marc MD sent at 9/15/2021  1:52 PM EDT -----  Please inform patient of negative lab result    ----- Message -----  From: Lab, Background User  Sent: 9/15/2021   1:40 PM EDT  To:  [address] Covid Results

## 2022-03-25 ENCOUNTER — TELEPHONE (OUTPATIENT)
Dept: FAMILY MEDICINE CLINIC | Facility: CLINIC | Age: 22
End: 2022-03-25

## 2022-03-25 NOTE — TELEPHONE ENCOUNTER
Called and spoke to patient. States letter dated 3/18/22 didn't require it to be attention to anyone. Sending now. Patient aware.

## 2022-03-25 NOTE — TELEPHONE ENCOUNTER
Caller: Miguel Angel Zuniga    Relationship: Self    Best call back number: [phone]    What form or medical record are you requesting: LETTER THAT STATES PATIENT SEES [facility] FOR HER WEIGHT LOSS    Who is requesting this form or medical record from you: [facility]    How would you like to receive the form or medical records (pick-up, mail, fax): FAX  If fax, what is the fax number  [phone]    Timeframe paperwork needed: AS SOON AS POSSIBLE      ADDITIONAL REQUEST: PATIENT STATED SHE WAS SUPPOSED TO  A LETTER THAT STATED SHE HAD COVID -19 BUT IS NOW OUT OF [facility] AND WAS UNABLE TO MAKE IT TO THE OFFICE TO PICK THIS UP. PATIENT WOULD LIKE TO KNOW IF THIS COULD BE FAXED TO [facility]   FAX NUMBER: [phone]

## 2022-04-14 ENCOUNTER — TELEPHONE (OUTPATIENT)
Dept: FAMILY MEDICINE CLINIC | Facility: CLINIC | Age: 22
End: 2022-04-14

## 2022-04-14 NOTE — TELEPHONE ENCOUNTER
Caller: Miguel Angel Zuniga    Relationship: Self    Best call back number: [phone]    What form or medical record are you requesting:  PAPERWORK ON WEIGHT LOSS FROM [facility]    Who is requesting this form or medical record from you: [facility]     \How would you like to receive the form or medical records (pick-up, mail, fax):     FAX    If fax, what is the fax number    [phone]  AND PLEASE DIRECT TO ELLIOT

## 2022-04-15 ENCOUNTER — PATIENT MESSAGE (OUTPATIENT)
Dept: FAMILY MEDICINE CLINIC | Facility: CLINIC | Age: 22
End: 2022-04-15

## 2022-04-15 NOTE — TELEPHONE ENCOUNTER
Attempted to contact patient. No answer; Left voicemail for patient to return call with office number given. Sending [employer] message for patient as well.

## 2022-04-15 NOTE — TELEPHONE ENCOUNTER
I do not recall receiving any weight loss forms for her. There is nothing scanned into Epic. I can fill them out at her appointment on Monday if she brings them with her.

## 2022-04-18 ENCOUNTER — LAB (OUTPATIENT)
Dept: LAB | Facility: HOSPITAL | Age: 22
End: 2022-04-18

## 2022-04-18 ENCOUNTER — OFFICE VISIT (OUTPATIENT)
Dept: FAMILY MEDICINE CLINIC | Facility: CLINIC | Age: 22
End: 2022-04-18

## 2022-04-18 VITALS
WEIGHT: 257 LBS | OXYGEN SATURATION: 97 % | HEART RATE: 102 BPM | RESPIRATION RATE: 16 BRPM | BODY MASS INDEX: 45.54 KG/M2 | SYSTOLIC BLOOD PRESSURE: 118 MMHG | DIASTOLIC BLOOD PRESSURE: 80 MMHG | TEMPERATURE: 96.8 F | HEIGHT: 63 IN

## 2022-04-18 DIAGNOSIS — R73.9 ELEVATED SERUM GLUCOSE: ICD-10-CM

## 2022-04-18 DIAGNOSIS — Z00.00 PREVENTATIVE HEALTH CARE: ICD-10-CM

## 2022-04-18 DIAGNOSIS — E66.01 CLASS 3 SEVERE OBESITY DUE TO EXCESS CALORIES WITH SERIOUS COMORBIDITY AND BODY MASS INDEX (BMI) OF 45.0 TO 49.9 IN ADULT: Primary | ICD-10-CM

## 2022-04-18 LAB — HBA1C MFR BLD: 5.4 % (ref 4.8–5.6)

## 2022-04-18 PROCEDURE — 83036 HEMOGLOBIN GLYCOSYLATED A1C: CPT

## 2022-04-18 PROCEDURE — 84443 ASSAY THYROID STIM HORMONE: CPT

## 2022-04-18 PROCEDURE — 99213 OFFICE O/P EST LOW 20 MIN: CPT | Performed by: PHYSICIAN ASSISTANT

## 2022-04-18 NOTE — PROGRESS NOTES
Chief Complaint   Patient presents with   • 1 month f/u weight loss       HPI     Miguel Angel Zuniga is a 21 y.o. female who is here for follow-up of obesity.     She has been trying to eat more seafood and protein. Has cut out sodas in the past month and increased her water intake. She is following a prescribed diet by her bariatric specialist.   She is walking on a treadmill three times weekly for about an hour. She has lost 5 pounds since her last visit with in February. Friend of her mother's has been taking Ozempic.     Past Medical History:   Diagnosis Date   • Asthma    • Depression    • GERD (gastroesophageal reflux disease)    • Hypertension        Past Surgical History:   Procedure Laterality Date   • NO PAST SURGERIES         Family History   Problem Relation Age of Onset   • Anxiety disorder Sister    • Depression Sister    • Post-traumatic stress disorder Maternal Uncle    • Anxiety disorder Maternal Uncle    • Depression Maternal Uncle        Social History     Socioeconomic History   • Marital status: Single   Tobacco Use   • Smoking status: Former Smoker     Packs/day: 0.25     Years: 5.00     Pack years: 1.25     Types: Cigarettes     Start date: 2016     Quit date: 2021     Years since quittin.9   • Smokeless tobacco: Never Used   Vaping Use   • Vaping Use: Every day   • Substances: Nicotine   Substance and Sexual Activity   • Alcohol use: Yes     Comment: OCASSIONALY   • Drug use: Not Currently   • Sexual activity: Defer       No Known Allergies    ROS    Review of Systems   Constitutional: Negative for appetite change and unexpected weight gain.       Vitals:    22 1343   BP: 118/80   Pulse: 102   Resp: 16   Temp: 96.8 °F (36 °C)   SpO2: 97%     Body mass index is 45.53 kg/m².      Current Outpatient Medications:   •  vitamin D (ERGOCALCIFEROL) 1.25 MG (19855 UT) capsule capsule, Take 1 capsule by mouth 1 (One) Time Per Week., Disp: 12 capsule, Rfl: 0  •  Vitamin D,  Cholecalciferol, 50 MCG (2000 UT) capsule, Take 1 capsule by mouth Daily., Disp: 90 capsule, Rfl: 1  •  ondansetron ODT (Zofran ODT) 4 MG disintegrating tablet, Place 1 tablet on the tongue Every 8 (Eight) Hours As Needed for Nausea or Vomiting., Disp: 30 tablet, Rfl: 0    PE    Physical Exam  Vitals reviewed.   Constitutional:       Appearance: She is obese.   Pulmonary:      Effort: Pulmonary effort is normal. No respiratory distress.   Neurological:      Mental Status: She is alert.   Psychiatric:         Mood and Affect: Mood normal.         Results    Results for orders placed or performed in visit on 02/10/22   Comprehensive metabolic panel    Specimen: Blood   Result Value Ref Range    Glucose 115 (H) 65 - 99 mg/dL    BUN 6 6 - 20 mg/dL    Creatinine 0.59 0.57 - 1.00 mg/dL    Sodium 139 136 - 145 mmol/L    Potassium 3.6 3.5 - 5.2 mmol/L    Chloride 105 98 - 107 mmol/L    CO2 24.6 22.0 - 29.0 mmol/L    Calcium 9.4 8.6 - 10.5 mg/dL    Total Protein 6.7 6.0 - 8.5 g/dL    Albumin 4.20 3.50 - 5.20 g/dL    ALT (SGPT) 46 (H) 1 - 33 U/L    AST (SGOT) 25 1 - 32 U/L    Alkaline Phosphatase 99 39 - 117 U/L    Total Bilirubin 0.4 0.0 - 1.2 mg/dL    eGFR Non African Amer 129 >60 mL/min/1.73    eGFR  African Amer >150 >60 mL/min/1.73    Globulin 2.5 gm/dL    A/G Ratio 1.7 g/dL    BUN/Creatinine Ratio 10.2 7.0 - 25.0    Anion Gap 9.4 5.0 - 15.0 mmol/L   C-reactive protein    Specimen: Blood   Result Value Ref Range    C-Reactive Protein 1.45 (H) 0.00 - 0.50 mg/dL   Celiac Disease Panel    Specimen: Blood   Result Value Ref Range    Endomysial IgA Negative Negative    Tissue Transglutaminase IgA <2 0 - 3 U/mL    IgA 267 87 - 352 mg/dL   CBC No Differential    Specimen: Blood   Result Value Ref Range    WBC 9.23 3.40 - 10.80 10*3/mm3    RBC 4.73 3.77 - 5.28 10*6/mm3    Hemoglobin 14.2 12.0 - 15.9 g/dL    Hematocrit 41.5 34.0 - 46.6 %    MCV 87.7 79.0 - 97.0 fL    MCH 30.0 26.6 - 33.0 pg    MCHC 34.2 31.5 - 35.7 g/dL    RDW  13.0 12.3 - 15.4 %    RDW-SD 41.3 37.0 - 54.0 fl    MPV 10.5 6.0 - 12.0 fL    Platelets 513 (H) 140 - 450 10*3/mm3   TSH Rfx On Abnormal To Free T4    Specimen: Blood   Result Value Ref Range    TSH 3.500 0.270 - 4.200 uIU/mL   Lipase    Specimen: Blood   Result Value Ref Range    Lipase 28 13 - 60 U/L   Vitamin D 25 Hydroxy    Specimen: Blood   Result Value Ref Range    25 Hydroxy, Vitamin D 14.2 ng/ml   Vitamin B12    Specimen: Blood   Result Value Ref Range    Vitamin B-12 473 211 - 946 pg/mL       A/P    Problem List Items Addressed This Visit        Endocrine and Metabolic    Class 3 severe obesity due to excess calories with body mass index (BMI) of 45.0 to 49.9 in adult (HCC) - Primary  -Continue low carb, high protein diet, exercise  -She has a family hx of diabetes and hx of elevated serum glucose. Check A1C  -She might want to trial GLP-1 agonist before bariatric surgery. She is going to do more research and discuss this with her mother. Can revisit at her next appointment in 1 month      Other Visit Diagnoses     Elevated serum glucose        Relevant Orders    Hemoglobin A1c          Plan of care was reviewed with patient at the conclusion of today's visit. Education was provided regarding diagnoses, management, and the importance of keeping follow-up appointments. The patient was counseled regarding the risks, benefits, and possible side-effects of treatment. Patient and/or family express understanding and agreement with the management plan.        DINO Andrews

## 2022-04-19 ENCOUNTER — TELEPHONE (OUTPATIENT)
Dept: FAMILY MEDICINE CLINIC | Facility: CLINIC | Age: 22
End: 2022-04-19

## 2022-04-19 LAB — TSH SERPL DL<=0.05 MIU/L-ACNC: 4.1 UIU/ML (ref 0.27–4.2)

## 2022-04-19 NOTE — TELEPHONE ENCOUNTER
Caller: Miguel Angel Zuniga    Relationship: Self    Best call back number:     What form or medical record are you requesting: FORMS FOR WEIGHT LOSS    Who is requesting this form or medical record from you: [facility]      Timeframe paperwork needed: PATIENT WAS ASKING THE  STATUS OF THIS PAPERWORK AND IF IT HAS BEEN SENT OVER TO [facility]; SHE ALSO STATED THAT THE PHONE NUMBER SHOULD BE ON THE FORMS WHERE TO SEND IT TO    PLEASE CALL TO [facility]

## 2022-04-19 NOTE — PROGRESS NOTES
I have reviewed the notes, assessments, and/or procedures performed by Cristhian Brown, I concur with her/his documentation of Miguel Angel Zuniga.

## 2022-04-20 NOTE — TELEPHONE ENCOUNTER
Attempted to contact patient, no answer. Left message to notify that weight loss form has been completed and sent to [facility] can relay and document.

## 2022-04-22 ENCOUNTER — OFFICE VISIT (OUTPATIENT)
Dept: FAMILY MEDICINE CLINIC | Facility: CLINIC | Age: 22
End: 2022-04-22

## 2022-04-22 VITALS
HEART RATE: 107 BPM | BODY MASS INDEX: 44.65 KG/M2 | TEMPERATURE: 97.1 F | DIASTOLIC BLOOD PRESSURE: 82 MMHG | OXYGEN SATURATION: 98 % | HEIGHT: 63 IN | WEIGHT: 252 LBS | RESPIRATION RATE: 14 BRPM | SYSTOLIC BLOOD PRESSURE: 118 MMHG

## 2022-04-22 DIAGNOSIS — J02.9 PHARYNGITIS, UNSPECIFIED ETIOLOGY: ICD-10-CM

## 2022-04-22 DIAGNOSIS — J06.9 ACUTE URI: Primary | ICD-10-CM

## 2022-04-22 LAB
EXPIRATION DATE: NORMAL
INTERNAL CONTROL: NORMAL
Lab: NORMAL
S PYO AG THROAT QL: NEGATIVE

## 2022-04-22 PROCEDURE — U0004 COV-19 TEST NON-CDC HGH THRU: HCPCS | Performed by: PHYSICIAN ASSISTANT

## 2022-04-22 PROCEDURE — 87880 STREP A ASSAY W/OPTIC: CPT | Performed by: PHYSICIAN ASSISTANT

## 2022-04-22 PROCEDURE — 99213 OFFICE O/P EST LOW 20 MIN: CPT | Performed by: PHYSICIAN ASSISTANT

## 2022-04-22 RX ORDER — GUAIFENESIN, PSEUDOEPHEDRINE HYDROCHLORIDE 600; 60 MG/1; MG/1
1 TABLET, EXTENDED RELEASE ORAL EVERY 12 HOURS
Qty: 14 TABLET | Refills: 0 | Status: SHIPPED | OUTPATIENT
Start: 2022-04-22 | End: 2022-11-23

## 2022-04-22 NOTE — PATIENT INSTRUCTIONS
Attain adequate rest and increase clear fluid intake.   Practice regular hand hygiene and cover your cough to help prevent spread of infection  Use warm salt water gargles, lozenges, and hot tea with honey as needed for throat comfort.   Use over-the-counter Delsym syrup as needed for coughing.   Use warm compresses over sinuses for comfort as needed  Alternate taking Tylenol 500 mg and Ibuprofen 400 mg every 4 hours as needed for headache, body aches, throat pain, and/or fever reduction  Quarantine at home until your COVID test is back  Please keep me informed of any acute changes in your status including new or worsening symptoms.      Saxenda for weight loss

## 2022-04-22 NOTE — PROGRESS NOTES
"    Chief Complaint   Patient presents with   • Cough     2 days   • Sore Throat     X 2 days   • bilateral ear pain   • URI       HPI     Miguel Angel Zuniga is a pleasant 21 y.o. female who presents for evaluation of \"chief complaint.\"   Patient c/o sore throat, cough productive of yellow sputum, head/nasal congestion. Also had left toothache but this improved after having a filling with her dentist. She reports a history of strep throat last year. She has not been vaccinated for COVID. Denies fever, shortness of breath, myalgias, sick contacts or travel.     Past Medical History:   Diagnosis Date   • Asthma    • Depression    • GERD (gastroesophageal reflux disease)    • Hypertension        Past Surgical History:   Procedure Laterality Date   • NO PAST SURGERIES         Family History   Problem Relation Age of Onset   • Anxiety disorder Sister    • Depression Sister    • Post-traumatic stress disorder Maternal Uncle    • Anxiety disorder Maternal Uncle    • Depression Maternal Uncle        Social History     Socioeconomic History   • Marital status: Single   Tobacco Use   • Smoking status: Former Smoker     Packs/day: 0.25     Years: 5.00     Pack years: 1.25     Types: Cigarettes     Start date: 2016     Quit date: 2021     Years since quittin.9   • Smokeless tobacco: Never Used   Vaping Use   • Vaping Use: Every day   • Substances: Nicotine   Substance and Sexual Activity   • Alcohol use: Yes     Comment: OCASSIONALY   • Drug use: Not Currently   • Sexual activity: Defer       No Known Allergies    ROS    Review of Systems   Constitutional: Negative for chills and fever.   HENT: Positive for congestion, ear pain, sneezing and sore throat.    Eyes: Positive for discharge and itching.   Respiratory: Positive for cough. Negative for shortness of breath and wheezing.    Cardiovascular: Negative for chest pain.   Gastrointestinal: Negative for diarrhea, nausea and vomiting.   Musculoskeletal: Negative for " myalgias.   Neurological: Positive for headache.       Vitals:    04/22/22 1507   BP: 118/82   Pulse: 107   Resp: 14   Temp: 97.1 °F (36.2 °C)   SpO2: 98%     Body mass index is 44.64 kg/m².      Current Outpatient Medications:   •  ondansetron ODT (Zofran ODT) 4 MG disintegrating tablet, Place 1 tablet on the tongue Every 8 (Eight) Hours As Needed for Nausea or Vomiting., Disp: 30 tablet, Rfl: 0  •  vitamin D (ERGOCALCIFEROL) 1.25 MG (56549 UT) capsule capsule, Take 1 capsule by mouth 1 (One) Time Per Week., Disp: 12 capsule, Rfl: 0  •  Vitamin D, Cholecalciferol, 50 MCG (2000 UT) capsule, Take 1 capsule by mouth Daily., Disp: 90 capsule, Rfl: 1  •  pseudoephedrine-guaifenesin (MUCINEX D)  MG per 12 hr tablet, Take 1 tablet by mouth Every 12 (Twelve) Hours., Disp: 14 tablet, Rfl: 0    PE    Physical Exam  Vitals reviewed.   Constitutional:       General: She is not in acute distress.     Appearance: She is well-developed.   HENT:      Head: Normocephalic.      Right Ear: Tympanic membrane and ear canal normal. Tympanic membrane is not erythematous.      Left Ear: Tympanic membrane and ear canal normal. Tympanic membrane is not erythematous.      Nose:      Right Sinus: No maxillary sinus tenderness or frontal sinus tenderness.      Left Sinus: No maxillary sinus tenderness or frontal sinus tenderness.      Mouth/Throat:      Mouth: Mucous membranes are moist.      Pharynx: Uvula midline. Posterior oropharyngeal erythema present.      Tonsils: No tonsillar exudate.   Eyes:      General:         Right eye: No discharge.         Left eye: No discharge.      Conjunctiva/sclera: Conjunctivae normal.   Cardiovascular:      Rate and Rhythm: Normal rate and regular rhythm.      Heart sounds: Normal heart sounds.   Pulmonary:      Effort: Pulmonary effort is normal. No respiratory distress.      Breath sounds: Normal breath sounds. No wheezing, rhonchi or rales.   Chest:   Breasts:      Right: No supraclavicular  adenopathy.      Left: No supraclavicular adenopathy.       Musculoskeletal:      Cervical back: Normal range of motion and neck supple.   Lymphadenopathy:      Cervical: No cervical adenopathy.      Upper Body:      Right upper body: No supraclavicular adenopathy.      Left upper body: No supraclavicular adenopathy.   Skin:     General: Skin is warm and dry.   Psychiatric:         Behavior: Behavior normal.          A/P    Problem List Items Addressed This Visit    None     Visit Diagnoses     Acute URI    -  Primary  -Discussed likely viral etiology and recommended initial symptomatic treatment. Ordered COVID test and advised patient to quarantine at home until her test result returns  -Rx mucinex-DM   -The patient was given written instructions recommending over-the-counter products and home remedies  -RTC if symptoms worsen or persist for more than 1 week    Relevant Orders    COVID-19 PCR, [facility], NP SWAB IN [facility] VIRAL TRANSPORT MEDIA/ORAL SWISH 24-30 HR TAT - Swab, Nasopharynx    Pharyngitis, unspecified etiology      -RSS negative    Relevant Orders    POCT rapid strep A (Completed)          Plan of care was reviewed with patient at the conclusion of today's visit. Education was provided regarding diagnoses, management, prescribed or recommended OTC products, and the importance of compliance with follow-up appointments. The patient was counseled regarding the risks, benefits, and possible side-effects of treatment. I advised the patient to keep me informed of any acute changes in their status including new, worsening, or persistent symptoms. Patient expresses understanding and agreement with the management plan.        DINO Andrews

## 2022-04-23 LAB — SARS-COV-2 RNA NOSE QL NAA+PROBE: NOT DETECTED

## 2022-04-27 ENCOUNTER — TELEPHONE (OUTPATIENT)
Dept: FAMILY MEDICINE CLINIC | Facility: CLINIC | Age: 22
End: 2022-04-27

## 2022-04-27 NOTE — TELEPHONE ENCOUNTER
Contacted patient to discuss, she does not agree with the termination letter she received. She reports that she called the office on the dates listed to reschedule or formally cancel her appointments.     I reminded her of the no show/ late policy. She began to mention the reasons why she had not arrived to her appointments. I reminded her that this is not able to changed at this point. She wants to escalate her complaint with management.

## 2022-04-27 NOTE — TELEPHONE ENCOUNTER
Caller: Miguel Angel Zuniga    Relationship: Self    Best call back number: [phone]     What is the best time to reach you: ANY    Who are you requesting to speak with (clinical staff, provider,  specific staff member):    EMILY MORFIN      Do you know the name of the person who called:   N/A    What was the call regarding:   N/A    Do you require a callback:     YES, RECEIVED A LETTER BEING DISCHARGED FROM THE PRACTICE. PATIENT IS CONFUSED, PLEASE CALL.

## 2022-05-13 ENCOUNTER — TELEPHONE (OUTPATIENT)
Dept: FAMILY MEDICINE CLINIC | Facility: CLINIC | Age: 22
End: 2022-05-13

## 2022-05-13 NOTE — TELEPHONE ENCOUNTER
Patient called requesting that a note regarding her weight loss treatment be placed in her chart for the next provider she will see so the order can be renewed at the 6 months.

## 2022-11-02 ENCOUNTER — TELEMEDICINE (OUTPATIENT)
Dept: FAMILY MEDICINE CLINIC | Facility: TELEHEALTH | Age: 22
End: 2022-11-02

## 2022-11-02 DIAGNOSIS — R10.30 LOWER ABDOMINAL PAIN: Primary | ICD-10-CM

## 2022-11-02 DIAGNOSIS — R19.7 DIARRHEA, UNSPECIFIED TYPE: ICD-10-CM

## 2022-11-02 PROCEDURE — 99213 OFFICE O/P EST LOW 20 MIN: CPT | Performed by: NURSE PRACTITIONER

## 2022-11-02 RX ORDER — DICYCLOMINE HYDROCHLORIDE 10 MG/1
10 CAPSULE ORAL
Qty: 12 CAPSULE | Refills: 0 | Status: SHIPPED | OUTPATIENT
Start: 2022-11-02

## 2022-11-02 NOTE — PROGRESS NOTES
CHIEF COMPLAINT  Chief Complaint   Patient presents with   • Abdominal Cramping   • Diarrhea         HPI  Miguel Angel Zuniga is a 22 y.o. female  presents with complaint of 2 day h/o abdominal cramping below her umbilicus that is intermittent and sharp. She also has associated diarrhea which has lessened today. She is drinking fluids and she denies fever, nausea or vomiting. She denies any urinary complaints at present. She has no known exposure to illness but is not sure if she ate something that could of been spoiled.     Review of Systems   Constitutional: Positive for activity change, appetite change and fatigue. Negative for chills and fever.   HENT: Negative.    Respiratory: Negative.    Cardiovascular: Negative.    Gastrointestinal: Positive for abdominal pain and diarrhea. Negative for anal bleeding, blood in stool, constipation, nausea and vomiting.   Musculoskeletal: Negative.    Skin: Negative.    Neurological: Negative.    Psychiatric/Behavioral: Negative.        Past Medical History:   Diagnosis Date   • Asthma    • Depression    • GERD (gastroesophageal reflux disease)    • Hypertension        Family History   Problem Relation Age of Onset   • Anxiety disorder Sister    • Depression Sister    • Post-traumatic stress disorder Maternal Uncle    • Anxiety disorder Maternal Uncle    • Depression Maternal Uncle        Social History     Socioeconomic History   • Marital status: Single   Tobacco Use   • Smoking status: Former     Packs/day: 0.25     Years: 5.00     Pack years: 1.25     Types: Cigarettes     Start date: 2016     Quit date: 2021     Years since quittin.5   • Smokeless tobacco: Never   Vaping Use   • Vaping Use: Every day   • Substances: Nicotine   Substance and Sexual Activity   • Alcohol use: Yes     Comment: OCASSIONALY   • Drug use: Not Currently   • Sexual activity: Defer         There were no vitals taken for this visit.    PHYSICAL EXAM  Physical Exam   Constitutional: She is  oriented to person, place, and time. She appears well-developed and well-nourished. She does not have a sickly appearance. She does not appear ill. No distress.   HENT:   Head: Normocephalic and atraumatic.   Abdominal: Soft. She exhibits no distension. There is abdominal tenderness (around and below umbillicus).   Neurological: She is alert and oriented to person, place, and time.   Psychiatric: She has a normal mood and affect.   Vitals reviewed.      Results for orders placed or performed in visit on 04/22/22   COVID-19 PCR, [facility], NP SWAB IN [facility] VIRAL TRANSPORT MEDIA/ORAL SWISH 24-30 HR TAT - Swab, Nasopharynx    Specimen: Nasopharynx; Swab   Result Value Ref Range    SARS-CoV-2 SARAH Not Detected Not Detected   POCT rapid strep A    Specimen: Swab   Result Value Ref Range    Rapid Strep A Screen Negative Negative, VALID, INVALID, Not Performed    Internal Control Passed Passed    Lot Number [id number]     Expiration Date 08/19/2024        Diagnoses and all orders for this visit:    1. Lower abdominal pain (Primary)  -     dicyclomine (BENTYL) 10 MG capsule; Take 1 capsule by mouth 4 (Four) Times a Day Before Meals & at Bedtime.  Dispense: 12 capsule; Refill: 0    2. Diarrhea, unspecified type    Clear liquids and rest.   Suspect AGE. Treat supportively.   No milk or dairy.   Follow up with PCP if no improvement in 5-7 days.   Advised to stay hydrated and for worsening s/s go to the [facility]>     The use of a video visit has been reviewed with the patient and verbal informd consent has een obtained. Myself and Miguel Angel Zuniga participated in this visit. The patient is located in [address] [place] [address]. I am located in [place], [place]. Mychart and Zoom were utilized. I spent 15  minutes in the patient's chart for this visit.           Sandy Bennett, DORIS  11/02/2022  09:22 EDT

## 2022-11-13 ENCOUNTER — TELEMEDICINE (OUTPATIENT)
Dept: FAMILY MEDICINE CLINIC | Facility: TELEHEALTH | Age: 22
End: 2022-11-13

## 2022-11-13 DIAGNOSIS — J11.1 INFLUENZA: Primary | ICD-10-CM

## 2022-11-13 PROCEDURE — 99213 OFFICE O/P EST LOW 20 MIN: CPT | Performed by: NURSE PRACTITIONER

## 2022-11-13 RX ORDER — ONDANSETRON 4 MG/1
4 TABLET, FILM COATED ORAL EVERY 8 HOURS PRN
Qty: 30 TABLET | Refills: 0 | Status: SHIPPED | OUTPATIENT
Start: 2022-11-13

## 2022-11-14 PROCEDURE — U0004 COV-19 TEST NON-CDC HGH THRU: HCPCS | Performed by: FAMILY MEDICINE

## 2022-11-14 NOTE — PROGRESS NOTES
You have chosen to receive care through a telehealth visit.  Do you consent to use a video/audio connection for your medical care today? Yes     CHIEF COMPLAINT  Chief Complaint   Patient presents with   • Vomiting         HPI  Miguel Angel Zuniga is a 22 y.o. female  presents with complaint of vomiting, diarrhea and fatigue that started on Thursday.  She states that she is feeling better today, just nauseated.  She is concerned about COVID.She states that on Thursday and Friday she had approx 4 vomiting episodes and 8 episodes of diarrhea. That has stopped, but she is still nauseated.    Review of Systems   Gastrointestinal: Positive for diarrhea and vomiting.   All other systems reviewed and are negative.      Past Medical History:   Diagnosis Date   • Asthma    • Depression    • GERD (gastroesophageal reflux disease)    • Hypertension        Family History   Problem Relation Age of Onset   • Anxiety disorder Sister    • Depression Sister    • Post-traumatic stress disorder Maternal Uncle    • Anxiety disorder Maternal Uncle    • Depression Maternal Uncle        Social History     Socioeconomic History   • Marital status: Single   Tobacco Use   • Smoking status: Former     Packs/day: 0.25     Years: 5.00     Pack years: 1.25     Types: Cigarettes     Start date: 2016     Quit date: 2021     Years since quittin.5   • Smokeless tobacco: Never   Vaping Use   • Vaping Use: Every day   • Substances: Nicotine   Substance and Sexual Activity   • Alcohol use: Yes     Comment: OCASSIONALY   • Drug use: Not Currently   • Sexual activity: Defer       Miguel Angel Zuniga  reports that she quit smoking about 18 months ago. Her smoking use included cigarettes. She started smoking about 6 years ago. She has a 1.25 pack-year smoking history. She has never used smokeless tobacco..              There were no vitals taken for this visit.    PHYSICAL EXAM  Physical Exam   Constitutional: She appears well-developed and  well-nourished.   HENT:   Head: Normocephalic.   Pulmonary/Chest: Effort normal.   Musculoskeletal: Normal range of motion.   Neurological: She is alert.   Psychiatric: She has a normal mood and affect.       Results for orders placed or performed in visit on 04/22/22   COVID-19 PCR, [facility], NP SWAB IN [facility] VIRAL TRANSPORT MEDIA/ORAL SWISH 24-30 HR TAT - Swab, Nasopharynx    Specimen: Nasopharynx; Swab   Result Value Ref Range    SARS-CoV-2 SARAH Not Detected Not Detected   POCT rapid strep A    Specimen: Swab   Result Value Ref Range    Rapid Strep A Screen Negative Negative, VALID, INVALID, Not Performed    Internal Control Passed Passed    Lot Number [id number]     Expiration Date 08/19/2024        Diagnoses and all orders for this visit:    1. Influenza (Primary)  -     ondansetron (Zofran) 4 MG tablet; Take 1 tablet by mouth Every 8 (Eight) Hours As Needed for Nausea or Vomiting.  Dispense: 30 tablet; Refill: 0          FOLLOW-UP  As discussed during visit with PCP/[facility] if no improvement or Urgent Care/Emergency Department if worsening of symptoms    Pt most likely had Influenza due to symptoms improving.  Tamiflu is not a consideration due to number of days pt has had symptoms.  Pt is going to have COVID test done tomorrow.    Patient verbalizes understanding of medication dosage, comfort measures, instructions for treatment and follow-up.    Pia Main, APRN  11/13/2022  20:57 EST    The use of a video visit has been reviewed with the patient and verbal informed consent has been obtained. Myself and Miguel Angel Zuniga participated in this visit. The patient is located in [address] [place] [address].    I am located in [place], [place]. Mychart and Zoom were utilized. I spent 20 minutes in the patient's chart for this visit.

## 2022-11-23 ENCOUNTER — TELEMEDICINE (OUTPATIENT)
Dept: FAMILY MEDICINE CLINIC | Facility: TELEHEALTH | Age: 22
End: 2022-11-23

## 2022-11-23 DIAGNOSIS — K52.9 GASTROENTERITIS: Primary | ICD-10-CM

## 2022-11-23 PROCEDURE — 99213 OFFICE O/P EST LOW 20 MIN: CPT | Performed by: NURSE PRACTITIONER

## 2022-11-23 RX ORDER — FAMOTIDINE 20 MG/1
20 TABLET, FILM COATED ORAL 2 TIMES DAILY PRN
Qty: 20 TABLET | Refills: 0 | Status: SHIPPED | OUTPATIENT
Start: 2022-11-23

## 2022-11-23 NOTE — PROGRESS NOTES
HPI  Miguel Angel Zuniga is a 22 y.o. female  presents with complaint of waking up today with N/V/D, abd cramps, heartburn. Needs work excuse. Brother sick with similar symptoms. Denies fever, chills,sweats, SOA, CP, bloody stools. Has taken zofran but not helping much.     Review of Systems    Past Medical History:   Diagnosis Date   • Asthma    • Depression    • GERD (gastroesophageal reflux disease)    • Hypertension        Family History   Problem Relation Age of Onset   • Anxiety disorder Sister    • Depression Sister    • Post-traumatic stress disorder Maternal Uncle    • Anxiety disorder Maternal Uncle    • Depression Maternal Uncle        Social History     Socioeconomic History   • Marital status: Single   Tobacco Use   • Smoking status: Former     Packs/day: 0.25     Years: 5.00     Pack years: 1.25     Types: Cigarettes     Start date: 2016     Quit date: 2021     Years since quittin.5   • Smokeless tobacco: Never   Vaping Use   • Vaping Use: Every day   • Substances: Nicotine   Substance and Sexual Activity   • Alcohol use: Yes     Comment: OCASSIONALY   • Drug use: Not Currently   • Sexual activity: Defer         There were no vitals taken for this visit.    PHYSICAL EXAM  Physical Exam   Constitutional: She appears well-developed and well-nourished.   HENT:   Head: Normocephalic.   Nose: Nose normal.   Neck: Neck normal appearance.  Pulmonary/Chest: Effort normal.   Neurological: She is alert.   Psychiatric: She has a normal mood and affect. Her speech is normal.       Diagnoses and all orders for this visit:    1. Gastroenteritis (Primary)  -     famotidine (Pepcid) 20 MG tablet; Take 1 tablet by mouth 2 (Two) Times a Day As Needed for Heartburn.  Dispense: 20 tablet; Refill: 0          FOLLOW-UP  As discussed during visit with [facility], if symptoms worsen or fail to improve, follow-up with PCP/Urgent Care/Emergency Department.    Patient verbalizes understanding of medications,  instructions for treatment and follow-up.    Roz Hassan, APRN  11/23/2022  12:08 EST    The use of a video visit has been reviewed with the patient and verbal informed consent has been obtained. Myself and Miguel Angel Zuniga participated in this visit. The patient is located in [place], [place], and I am located in [place], [place]. MyChart and Zoom were utilized.

## 2022-11-23 NOTE — PATIENT INSTRUCTIONS
Viral Gastroenteritis, Adult  Viral gastroenteritis is also known as the stomach flu. This condition may affect your stomach, small intestine, and large intestine. It can cause sudden watery diarrhea, fever, and vomiting. This condition is caused by many different viruses. These viruses can be passed from person to person very easily (are contagious).  Diarrhea and vomiting can make you feel weak and cause you to become dehydrated. You may not be able to keep fluids down. Dehydration can make you tired and thirsty, cause you to have a dry mouth, and decrease how often you urinate. It is important to replace the fluids that you lose from diarrhea and vomiting.  What are the causes?  Gastroenteritis is caused by many viruses, including rotavirus and norovirus. Norovirus is the most common cause in adults. You can get sick after being exposed to the viruses from other people. You can also get sick by:  Eating food, drinking water, or touching a surface contaminated with one of these viruses.  Sharing utensils or other personal items with an infected person.  What increases the risk?  You are more likely to develop this condition if you:  Have a weak body defense system (immune system).  Live with one or more children who are younger than 2 years old.  Live in a nursing home.  Travel on cruise ships.  What are the signs or symptoms?  Symptoms of this condition start suddenly 1-3 days after exposure to a virus. Symptoms may last for a few days or for as long as a week. Common symptoms include watery diarrhea and vomiting. Other symptoms include:  Fever.  Headache.  Fatigue.  Pain in the abdomen.  Chills.  Weakness.  Nausea.  Muscle aches.  Loss of appetite.  How is this diagnosed?  This condition is diagnosed with a medical history and physical exam. You may also have a stool test to check for viruses or other infections.  How is this treated?  This condition typically goes away on its own. The focus of treatment is to  prevent dehydration and restore lost fluids (rehydration). This condition may be treated with:  An oral rehydration solution (ORS) to replace important salts and minerals (electrolytes) in your body. Take this if told by your health care provider. This is a drink that is sold at pharmacies and retail stores.  Medicines to help with your symptoms.  Probiotic supplements to reduce symptoms of diarrhea.  Fluids given through an IV, if dehydration is severe.  Older adults and people with other diseases or a weak immune system are at higher risk for dehydration.  Follow these instructions at home:  Eating and drinking    Take an ORS as told by your health care provider.  Drink clear fluids in small amounts as you are able. Clear fluids include:  Water.  Ice chips.  Diluted fruit juice.  Low-calorie sports drinks.  Drink enough fluid to keep your urine pale yellow.  Eat small amounts of healthy foods every 3-4 hours as you are able. This may include whole grains, fruits, vegetables, lean meats, and yogurt.  Avoid fluids that contain a lot of sugar or caffeine, such as energy drinks, sports drinks, and soda.  Avoid spicy or fatty foods.  Avoid alcohol.  General instructions  Wash your hands often, especially after having diarrhea or vomiting. If soap and water are not available, use hand .  Make sure that all people in your household wash their hands well and often.  Take over-the-counter and prescription medicines only as told by your health care provider.  Rest at home while you recover.  Watch your condition for any changes.  Take a warm bath to relieve any burning or pain from frequent diarrhea episodes.  Keep all follow-up visits as told by your health care provider. This is important.  Contact a health care provider if you:  Cannot keep fluids down.  Have symptoms that get worse.  Have new symptoms.  Feel light-headed or dizzy.  Have muscle cramps.  Get help right away if you:  Have chest pain.  Feel  extremely weak or you faint.  See blood in your vomit.  Have vomit that looks like coffee grounds.  Have bloody or black stools or stools that look like tar.  Have a severe headache, a stiff neck, or both.  Have a rash.  Have severe pain, cramping, or bloating in your abdomen.  Have trouble breathing or you are breathing very quickly.  Have a fast heartbeat.  Have skin that feels cold and clammy.  Feel confused.  Have pain when you urinate.  Have signs of dehydration, such as:  Dark urine, very little urine, or no urine.  Cracked lips.  Dry mouth.  Sunken eyes.  Sleepiness.  Weakness.  Summary  Viral gastroenteritis is also known as the stomach flu. It can cause sudden watery diarrhea, fever, and vomiting.  This condition can be passed from person to person very easily (is contagious).  Take an ORS if told by your health care provider. This is a drink that is sold at pharmacies and retail stores.  Wash your hands often, especially after having diarrhea or vomiting. If soap and water are not available, use hand .  This information is not intended to replace advice given to you by your health care provider. Make sure you discuss any questions you have with your health care provider.  Document Revised: 06/05/2020 Document Reviewed: 10/23/2019  [employer] Patient Education © 2022 [employer]

## 2023-10-07 ENCOUNTER — HOSPITAL ENCOUNTER (EMERGENCY)
Facility: HOSPITAL | Age: 23
Discharge: HOME | End: 2023-10-07
Attending: EMERGENCY MEDICINE
Payer: MEDICAID

## 2023-10-07 VITALS
BODY MASS INDEX: 45.89 KG/M2 | SYSTOLIC BLOOD PRESSURE: 157 MMHG | HEART RATE: 101 BPM | DIASTOLIC BLOOD PRESSURE: 118 MMHG | TEMPERATURE: 98.8 F | OXYGEN SATURATION: 100 % | WEIGHT: 259 LBS | HEIGHT: 63 IN | RESPIRATION RATE: 18 BRPM

## 2023-10-07 DIAGNOSIS — M26.609 TMJ (TEMPOROMANDIBULAR JOINT DISORDER): Primary | ICD-10-CM

## 2023-10-07 PROCEDURE — 63710000001 PREDNISONE PER 1 MG: Performed by: EMERGENCY MEDICINE

## 2023-10-07 PROCEDURE — 99283 EMERGENCY DEPT VISIT LOW MDM: CPT

## 2023-10-07 RX ORDER — PREDNISONE 20 MG/1
20 TABLET ORAL ONCE
Status: COMPLETED | OUTPATIENT
Start: 2023-10-07 | End: 2023-10-07

## 2023-10-07 RX ORDER — PREDNISONE 20 MG/1
20 TABLET ORAL DAILY
Qty: 5 TABLET | Refills: 0 | Status: SHIPPED | OUTPATIENT
Start: 2023-10-07 | End: 2023-10-12

## 2023-10-07 RX ORDER — PREDNISONE 20 MG/1
TABLET ORAL
Status: DISCONTINUED
Start: 2023-10-07 | End: 2023-10-07 | Stop reason: HOSPADM

## 2023-10-07 RX ORDER — NAPROXEN 250 MG/1
500 TABLET ORAL ONCE
Status: COMPLETED | OUTPATIENT
Start: 2023-10-07 | End: 2023-10-07

## 2023-10-07 RX ORDER — NAPROXEN 250 MG/1
TABLET ORAL
Status: DISCONTINUED
Start: 2023-10-07 | End: 2023-10-07 | Stop reason: HOSPADM

## 2023-10-07 RX ORDER — NAPROXEN 500 MG/1
500 TABLET ORAL 2 TIMES DAILY PRN
Qty: 14 TABLET | Refills: 0 | Status: SHIPPED | OUTPATIENT
Start: 2023-10-07

## 2023-10-07 RX ADMIN — NAPROXEN 500 MG: 250 TABLET ORAL at 02:33

## 2023-10-07 RX ADMIN — PREDNISONE 20 MG: 20 TABLET ORAL at 02:33

## 2023-10-07 NOTE — ED PROVIDER NOTES
Subjective  History of Present Illness:    Chief Complaint: Left jaw pain and ear pain, radiates down left side of the neck.  History of Present Illness: 23-year-old female with left jaw pain, it hurts when she bites down or opens up her jaw, and the pain radiates down her neck.  She describes it as a sharp pain.  Worse with movement.  Onset: Sudden onset  Duration: Several days  Exacerbating / Alleviating factors: Pain with opening and closing her jaw,  Associated symptoms: Any eights down the left side of her neck      Nurses Notes reviewed and agree, including vitals, allergies, social history and prior medical history.     REVIEW OF SYSTEMS: All systems reviewed and not pertinent unless noted.    Review of Systems   HENT:          Left jaw pain   All other systems reviewed and are negative.      Past Medical History:   Diagnosis Date    Asthma     Depression     GERD (gastroesophageal reflux disease)     Hypertension        Allergies:    Patient has no known allergies.      Past Surgical History:   Procedure Laterality Date    HAND SURGERY Right     3rd digit    NO PAST SURGERIES           Social History     Socioeconomic History    Marital status: Single   Tobacco Use    Smoking status: Former     Packs/day: 0.25     Years: 5.00     Additional pack years: 0.00     Total pack years: 1.25     Types: Cigarettes     Start date: 2016     Quit date: 2021     Years since quittin.4    Smokeless tobacco: Never   Vaping Use    Vaping Use: Former    Substances: Nicotine   Substance and Sexual Activity    Alcohol use: Yes     Comment: OCASSIONALY    Drug use: Not Currently    Sexual activity: Defer         Family History   Problem Relation Age of Onset    Anxiety disorder Sister     Depression Sister     Post-traumatic stress disorder Maternal Uncle     Anxiety disorder Maternal Uncle     Depression Maternal Uncle        Objective  Physical Exam:  BP (!) 157/118   Pulse 101   Temp 98.8 °F (37.1 °C) (Oral)    "Resp 18   Ht 160 cm (63\")   Wt 117 kg (259 lb)   SpO2 100%   BMI 45.88 kg/m²      Physical Exam  Vitals and nursing note reviewed.   Constitutional:       Appearance: She is well-developed.   HENT:      Head:        Comments: Pain in the left TMJ area on palpation     Right Ear: Tympanic membrane normal.      Left Ear: Tympanic membrane normal.      Mouth/Throat:      Mouth: Mucous membranes are moist.   Pulmonary:      Effort: Pulmonary effort is normal.   Musculoskeletal:         General: Normal range of motion.      Cervical back: Normal range of motion and neck supple.   Skin:     General: Skin is warm and dry.   Neurological:      Mental Status: She is alert and oriented to person, place, and time.      Deep Tendon Reflexes: Reflexes are normal and symmetric.           Procedures    ED Course:         Lab Results (last 24 hours)       ** No results found for the last 24 hours. **             No radiology results from the last 24 hrs       Medical Decision Making  Patient Presentation 23-year-old female presented with pain in her left jaw on movement    DDX dental pain, otitis, earache, TMJ    Data Review/ Non ED Records ?Analysis/Ordering unique tests no review of records or test    Independent Review Studies no independent review of study    Intervention/Re-evaluation patient received anti-inflammatory pain medicine    Independent Clinician no consultation    Risk Stratification tools/clinical decision rules symptoms consistent with TMJ, patient given instruction on medication, muscle relaxers, if symptoms persist she may need to follow-up with oral maxillofacial    Shared Decision Making patient agreed with plan    Disposition she is stable for discharge    Problems Addressed:  TMJ (temporomandibular joint disorder): complicated acute illness or injury    Risk  Prescription drug management.          Final diagnoses:   TMJ (temporomandibular joint disorder)          Bret Gomez Jr., PA-C  10/07/23 " [id number]

## 2024-11-27 PROCEDURE — 87088 URINE BACTERIA CULTURE: CPT

## 2024-11-27 PROCEDURE — 87186 SC STD MICRODIL/AGAR DIL: CPT

## 2024-11-27 PROCEDURE — 87086 URINE CULTURE/COLONY COUNT: CPT
